# Patient Record
Sex: FEMALE | Race: WHITE | NOT HISPANIC OR LATINO | URBAN - METROPOLITAN AREA
[De-identification: names, ages, dates, MRNs, and addresses within clinical notes are randomized per-mention and may not be internally consistent; named-entity substitution may affect disease eponyms.]

---

## 2018-12-05 ENCOUNTER — OFFICE (OUTPATIENT)
Dept: URBAN - METROPOLITAN AREA CLINIC 101 | Facility: CLINIC | Age: 73
End: 2018-12-05

## 2018-12-05 ENCOUNTER — OFFICE (OUTPATIENT)
Dept: URBAN - METROPOLITAN AREA CLINIC 101 | Facility: CLINIC | Age: 73
End: 2018-12-05
Payer: COMMERCIAL

## 2018-12-05 VITALS
TEMPERATURE: 97.2 F | SYSTOLIC BLOOD PRESSURE: 157 MMHG | WEIGHT: 183 LBS | DIASTOLIC BLOOD PRESSURE: 84 MMHG | HEIGHT: 64 IN | HEART RATE: 69 BPM

## 2018-12-05 DIAGNOSIS — Z86.010 PERSONAL HISTORY OF COLONIC POLYPS: ICD-10-CM

## 2018-12-05 DIAGNOSIS — Z80.0 FAMILY HISTORY OF MALIGNANT NEOPLASM OF DIGESTIVE ORGANS: ICD-10-CM

## 2018-12-05 DIAGNOSIS — K57.30 DIVERTICULOSIS OF LARGE INTESTINE WITHOUT PERFORATION OR ABS: ICD-10-CM

## 2018-12-05 PROCEDURE — 99204 OFFICE O/P NEW MOD 45 MIN: CPT

## 2018-12-05 PROCEDURE — 00031: CPT

## 2018-12-05 NOTE — SERVICEHPINOTES
ESPERANZA ARGUETA   is a   73   year old    female who is being seen in consultation at the request of   SYEDA GRIMALDO   for OV prior to recall colonoscopy. Her last colonoscopy 07/2013 TA polyp recall 3 yrs. She has BMs 1-5x/day, BSS type 4 predominately with complete evacuation. She has LLQ mild "discomfort" (no pain/no cramping) occurs daily, lasting few minutes, improved with defecation. She is doing low car diet with weight watchers and lost 10 lbs since 10/2018. She was seen last year at [facility] on 11/27/17 due to LLQ pain with CT diagnosing diverticulitis that improved with abx No recurrent diverticulitis. Recent physical exam with labs by PCP this week per patient all normal. Denies fevers, n/v, blood in stool, melena, change in bowel habits, blood in stool, melena, unintentional weight loss.

## 2018-12-18 ENCOUNTER — ON CAMPUS - OUTPATIENT (OUTPATIENT)
Dept: URBAN - METROPOLITAN AREA HOSPITAL 35 | Facility: HOSPITAL | Age: 73
End: 2018-12-18
Payer: COMMERCIAL

## 2018-12-18 DIAGNOSIS — D12.3 BENIGN NEOPLASM OF TRANSVERSE COLON: ICD-10-CM

## 2018-12-18 DIAGNOSIS — Z80.0 FAMILY HISTORY OF MALIGNANT NEOPLASM OF DIGESTIVE ORGANS: ICD-10-CM

## 2018-12-18 DIAGNOSIS — D12.0 BENIGN NEOPLASM OF CECUM: ICD-10-CM

## 2018-12-18 DIAGNOSIS — Z86.010 PERSONAL HISTORY OF COLONIC POLYPS: ICD-10-CM

## 2018-12-18 DIAGNOSIS — K63.5 POLYP OF COLON: ICD-10-CM

## 2018-12-18 DIAGNOSIS — K62.1 RECTAL POLYP: ICD-10-CM

## 2018-12-18 PROCEDURE — 45380 COLONOSCOPY AND BIOPSY: CPT | Mod: PT

## 2022-05-10 ENCOUNTER — OFFICE (OUTPATIENT)
Dept: URBAN - METROPOLITAN AREA CLINIC 102 | Facility: CLINIC | Age: 77
End: 2022-05-10
Payer: COMMERCIAL

## 2022-05-10 VITALS
SYSTOLIC BLOOD PRESSURE: 146 MMHG | WEIGHT: 172 LBS | DIASTOLIC BLOOD PRESSURE: 66 MMHG | TEMPERATURE: 97.9 F | HEIGHT: 64 IN | HEART RATE: 79 BPM

## 2022-05-10 DIAGNOSIS — Z87.19 PERSONAL HISTORY OF OTHER DISEASES OF THE DIGESTIVE SYSTEM: ICD-10-CM

## 2022-05-10 DIAGNOSIS — Z86.010 PERSONAL HISTORY OF COLONIC POLYPS: ICD-10-CM

## 2022-05-10 DIAGNOSIS — S32.039A: ICD-10-CM

## 2022-05-10 PROCEDURE — 99204 OFFICE O/P NEW MOD 45 MIN: CPT

## 2022-05-10 NOTE — SERVICEHPINOTES
Ale Altamirano is a 78 yo female present for colonoscopy consultation at the request of SYEDA GRIMALDO for OV prior to recall 
br
No N/V/D. No blood in stool. No weight loss. br She reports that she was diagnosed for diverticulitis in 03/2022 in [place] that improved with abx. She feels better now but still has mild discomfort LLQ which lasting a few minutes. Had a CT for back pain and diagnosed L3 fracture last week. 
br She is on pain medication which controls her back pain. Expected recovery would be about 6 to 8 weeks per Orthopedic.  Pt cannot bend over. Difficulty daily activity.br She reports that she had constipation when she had a back pain but now back to normal BM. Once or twice BM with type 4 at BSS chart. 
 last colonoscopy 12/2018: benign adenomatous polyps recall colonoscopy 3 yrs.br07/2013 TA polyp recall 3 yrs.br 
Mother had a colon CA. No heart or lung issues. No blood thinner.  br
br

## 2022-09-19 ENCOUNTER — ON CAMPUS - OUTPATIENT (OUTPATIENT)
Dept: URBAN - METROPOLITAN AREA HOSPITAL 16 | Facility: HOSPITAL | Age: 77
End: 2022-09-19
Payer: COMMERCIAL

## 2022-09-19 DIAGNOSIS — K63.5 POLYP OF COLON: ICD-10-CM

## 2022-09-19 DIAGNOSIS — Z86.010 PERSONAL HISTORY OF COLONIC POLYPS: ICD-10-CM

## 2022-09-19 PROCEDURE — 45380 COLONOSCOPY AND BIOPSY: CPT | Mod: 59 | Performed by: INTERNAL MEDICINE

## 2022-09-19 PROCEDURE — 45385 COLONOSCOPY W/LESION REMOVAL: CPT | Performed by: INTERNAL MEDICINE
